# Patient Record
Sex: FEMALE | Race: WHITE | Employment: UNEMPLOYED | URBAN - METROPOLITAN AREA
[De-identification: names, ages, dates, MRNs, and addresses within clinical notes are randomized per-mention and may not be internally consistent; named-entity substitution may affect disease eponyms.]

---

## 2017-01-03 PROBLEM — G43.009 MIGRAINE WITHOUT AURA AND WITHOUT STATUS MIGRAINOSUS, NOT INTRACTABLE: Status: ACTIVE | Noted: 2017-01-03

## 2017-03-20 PROBLEM — M25.50 CHRONIC JOINT PAIN: Status: ACTIVE | Noted: 2017-03-20

## 2017-03-20 PROBLEM — G89.29 CHRONIC JOINT PAIN: Status: ACTIVE | Noted: 2017-03-20

## 2017-09-20 PROBLEM — Z87.891 FORMER SMOKER: Status: ACTIVE | Noted: 2017-09-20

## 2018-01-11 PROBLEM — Z99.89 OSA ON CPAP: Status: ACTIVE | Noted: 2018-01-11

## 2018-01-11 PROBLEM — G47.33 OSA ON CPAP: Status: ACTIVE | Noted: 2018-01-11

## 2018-02-04 PROBLEM — M54.50 CHRONIC LOW BACK PAIN WITHOUT SCIATICA: Status: ACTIVE | Noted: 2018-02-04

## 2018-02-04 PROBLEM — G89.29 CHRONIC LOW BACK PAIN WITHOUT SCIATICA: Status: ACTIVE | Noted: 2018-02-04

## 2018-04-09 PROBLEM — E66.01 SEVERE OBESITY (BMI 35.0-39.9) WITH COMORBIDITY (HCC): Status: ACTIVE | Noted: 2018-04-09

## 2018-10-10 PROBLEM — F33.1 MDD (MAJOR DEPRESSIVE DISORDER), RECURRENT EPISODE, MODERATE (HCC): Status: ACTIVE | Noted: 2018-10-10

## 2021-04-06 ENCOUNTER — TRANSCRIBE ORDER (OUTPATIENT)
Dept: SCHEDULING | Age: 51
End: 2021-04-06

## 2021-04-06 DIAGNOSIS — Z78.0 ASYMPTOMATIC POSTMENOPAUSAL STATE: Primary | ICD-10-CM

## 2021-04-07 ENCOUNTER — TRANSCRIBE ORDER (OUTPATIENT)
Dept: SCHEDULING | Age: 51
End: 2021-04-07

## 2021-04-07 DIAGNOSIS — Z12.31 ENCOUNTER FOR SCREENING MAMMOGRAM FOR MALIGNANT NEOPLASM OF BREAST: Primary | ICD-10-CM

## 2022-03-18 PROBLEM — F33.1 MDD (MAJOR DEPRESSIVE DISORDER), RECURRENT EPISODE, MODERATE (HCC): Status: ACTIVE | Noted: 2018-10-10

## 2022-03-18 PROBLEM — M54.50 CHRONIC LOW BACK PAIN WITHOUT SCIATICA: Status: ACTIVE | Noted: 2018-02-04

## 2022-03-18 PROBLEM — G89.29 CHRONIC LOW BACK PAIN WITHOUT SCIATICA: Status: ACTIVE | Noted: 2018-02-04

## 2022-03-18 PROBLEM — E66.01 SEVERE OBESITY (BMI 35.0-39.9) WITH COMORBIDITY (HCC): Status: ACTIVE | Noted: 2018-04-09

## 2022-03-19 PROBLEM — G89.29 CHRONIC JOINT PAIN: Status: ACTIVE | Noted: 2017-03-20

## 2022-03-19 PROBLEM — G47.33 OSA ON CPAP: Status: ACTIVE | Noted: 2018-01-11

## 2022-03-19 PROBLEM — G43.009 MIGRAINE WITHOUT AURA AND WITHOUT STATUS MIGRAINOSUS, NOT INTRACTABLE: Status: ACTIVE | Noted: 2017-01-03

## 2022-03-19 PROBLEM — Z87.891 FORMER SMOKER: Status: ACTIVE | Noted: 2017-09-20

## 2022-03-19 PROBLEM — M25.50 CHRONIC JOINT PAIN: Status: ACTIVE | Noted: 2017-03-20

## 2022-03-19 PROBLEM — Z99.89 OSA ON CPAP: Status: ACTIVE | Noted: 2018-01-11

## 2023-03-04 ENCOUNTER — HOSPITAL ENCOUNTER (EMERGENCY)
Age: 53
Discharge: HOME | End: 2023-03-04
Attending: EMERGENCY MEDICINE
Payer: MEDICARE

## 2023-03-04 ENCOUNTER — APPOINTMENT (OUTPATIENT)
Dept: CT IMAGING | Age: 53
End: 2023-03-04
Payer: MEDICARE

## 2023-03-04 VITALS
SYSTOLIC BLOOD PRESSURE: 106 MMHG | TEMPERATURE: 97.4 F | HEART RATE: 75 BPM | WEIGHT: 200 LBS | RESPIRATION RATE: 14 BRPM | DIASTOLIC BLOOD PRESSURE: 66 MMHG | OXYGEN SATURATION: 99 % | BODY MASS INDEX: 31.39 KG/M2 | HEIGHT: 67 IN

## 2023-03-04 DIAGNOSIS — R19.7 NAUSEA VOMITING AND DIARRHEA: Primary | ICD-10-CM

## 2023-03-04 DIAGNOSIS — R53.1 GENERALIZED WEAKNESS: ICD-10-CM

## 2023-03-04 DIAGNOSIS — R11.2 NAUSEA VOMITING AND DIARRHEA: Primary | ICD-10-CM

## 2023-03-04 DIAGNOSIS — E86.0 DEHYDRATION: ICD-10-CM

## 2023-03-04 LAB
ALBUMIN SERPL-MCNC: 3.9 G/DL (ref 3.5–5)
ALBUMIN/GLOB SERPL: 1.1 (ref 0.4–1.6)
ALP SERPL-CCNC: 102 U/L (ref 50–130)
ALT SERPL-CCNC: 19 U/L (ref 12–65)
ANION GAP SERPL CALC-SCNC: 6 MMOL/L (ref 2–11)
AST SERPL-CCNC: 18 U/L (ref 15–37)
BASOPHILS # BLD: 0 K/UL (ref 0–0.2)
BASOPHILS NFR BLD: 1 % (ref 0–2)
BILIRUB SERPL-MCNC: 0.6 MG/DL (ref 0.2–1.1)
BUN SERPL-MCNC: 25 MG/DL (ref 6–23)
C. DIFFICILE TOXIN MOLECULAR: NEGATIVE
CALCIUM SERPL-MCNC: 9.3 MG/DL (ref 8.3–10.4)
CHLORIDE SERPL-SCNC: 112 MMOL/L (ref 101–110)
CO2 SERPL-SCNC: 25 MMOL/L (ref 21–32)
CREAT SERPL-MCNC: 0.8 MG/DL (ref 0.6–1)
DIFFERENTIAL METHOD BLD: ABNORMAL
EOSINOPHIL # BLD: 0.1 K/UL (ref 0–0.8)
EOSINOPHIL NFR BLD: 2 % (ref 0.5–7.8)
ERYTHROCYTE [DISTWIDTH] IN BLOOD BY AUTOMATED COUNT: 13.1 % (ref 11.9–14.6)
ETHANOL SERPL-MCNC: <3 MG/DL (ref 0–0.08)
GLOBULIN SER CALC-MCNC: 3.5 G/DL (ref 2.8–4.5)
GLUCOSE SERPL-MCNC: 148 MG/DL (ref 65–100)
HCT VFR BLD AUTO: 50.9 % (ref 35.8–46.3)
HGB BLD-MCNC: 16.8 G/DL (ref 11.7–15.4)
IMM GRANULOCYTES # BLD AUTO: 0 K/UL (ref 0–0.5)
IMM GRANULOCYTES NFR BLD AUTO: 0 % (ref 0–5)
LIPASE SERPL-CCNC: 89 U/L (ref 73–393)
LYMPHOCYTES # BLD: 0.6 K/UL (ref 0.5–4.6)
LYMPHOCYTES NFR BLD: 8 % (ref 13–44)
MCH RBC QN AUTO: 30.8 PG (ref 26.1–32.9)
MCHC RBC AUTO-ENTMCNC: 33 G/DL (ref 31.4–35)
MCV RBC AUTO: 93.2 FL (ref 82–102)
MONOCYTES # BLD: 0.6 K/UL (ref 0.1–1.3)
MONOCYTES NFR BLD: 8 % (ref 4–12)
NEUTS SEG # BLD: 6 K/UL (ref 1.7–8.2)
NEUTS SEG NFR BLD: 81 % (ref 43–78)
NRBC # BLD: 0 K/UL (ref 0–0.2)
PLATELET # BLD AUTO: 238 K/UL (ref 150–450)
PMV BLD AUTO: 10.5 FL (ref 9.4–12.3)
POTASSIUM SERPL-SCNC: 3.8 MMOL/L (ref 3.5–5.1)
PROT SERPL-MCNC: 7.4 G/DL (ref 6.3–8.2)
RBC # BLD AUTO: 5.46 M/UL (ref 4.05–5.2)
SODIUM SERPL-SCNC: 143 MMOL/L (ref 133–143)
WBC # BLD AUTO: 7.4 K/UL (ref 4.3–11.1)

## 2023-03-04 PROCEDURE — 80053 COMPREHEN METABOLIC PANEL: CPT

## 2023-03-04 PROCEDURE — 96375 TX/PRO/DX INJ NEW DRUG ADDON: CPT

## 2023-03-04 PROCEDURE — 70450 CT HEAD/BRAIN W/O DYE: CPT

## 2023-03-04 PROCEDURE — 2580000003 HC RX 258: Performed by: EMERGENCY MEDICINE

## 2023-03-04 PROCEDURE — 85025 COMPLETE CBC W/AUTO DIFF WBC: CPT

## 2023-03-04 PROCEDURE — A4216 STERILE WATER/SALINE, 10 ML: HCPCS | Performed by: EMERGENCY MEDICINE

## 2023-03-04 PROCEDURE — 6370000000 HC RX 637 (ALT 250 FOR IP): Performed by: EMERGENCY MEDICINE

## 2023-03-04 PROCEDURE — 93005 ELECTROCARDIOGRAM TRACING: CPT | Performed by: EMERGENCY MEDICINE

## 2023-03-04 PROCEDURE — 96361 HYDRATE IV INFUSION ADD-ON: CPT

## 2023-03-04 PROCEDURE — 87493 C DIFF AMPLIFIED PROBE: CPT

## 2023-03-04 PROCEDURE — 87046 STOOL CULTR AEROBIC BACT EA: CPT

## 2023-03-04 PROCEDURE — 83690 ASSAY OF LIPASE: CPT

## 2023-03-04 PROCEDURE — 82077 ASSAY SPEC XCP UR&BREATH IA: CPT

## 2023-03-04 PROCEDURE — 6360000002 HC RX W HCPCS: Performed by: EMERGENCY MEDICINE

## 2023-03-04 PROCEDURE — 96374 THER/PROPH/DIAG INJ IV PUSH: CPT

## 2023-03-04 PROCEDURE — C9113 INJ PANTOPRAZOLE SODIUM, VIA: HCPCS | Performed by: EMERGENCY MEDICINE

## 2023-03-04 PROCEDURE — 99284 EMERGENCY DEPT VISIT MOD MDM: CPT

## 2023-03-04 RX ORDER — DIPHENOXYLATE HYDROCHLORIDE AND ATROPINE SULFATE 2.5; .025 MG/1; MG/1
1 TABLET ORAL
Status: COMPLETED | OUTPATIENT
Start: 2023-03-04 | End: 2023-03-04

## 2023-03-04 RX ORDER — ONDANSETRON 4 MG/1
4 TABLET, FILM COATED ORAL EVERY 8 HOURS PRN
Qty: 14 TABLET | Refills: 0 | Status: SHIPPED | OUTPATIENT
Start: 2023-03-04

## 2023-03-04 RX ORDER — KETOROLAC TROMETHAMINE 30 MG/ML
30 INJECTION, SOLUTION INTRAMUSCULAR; INTRAVENOUS
Status: COMPLETED | OUTPATIENT
Start: 2023-03-04 | End: 2023-03-04

## 2023-03-04 RX ORDER — ONDANSETRON 2 MG/ML
4 INJECTION INTRAMUSCULAR; INTRAVENOUS
Status: COMPLETED | OUTPATIENT
Start: 2023-03-04 | End: 2023-03-04

## 2023-03-04 RX ORDER — 0.9 % SODIUM CHLORIDE 0.9 %
1000 INTRAVENOUS SOLUTION INTRAVENOUS ONCE
Status: COMPLETED | OUTPATIENT
Start: 2023-03-04 | End: 2023-03-04

## 2023-03-04 RX ORDER — HYOSCYAMINE SULFATE 0.12 MG/1
1 TABLET SUBLINGUAL
Qty: 20 EACH | Refills: 0 | Status: SHIPPED | OUTPATIENT
Start: 2023-03-04

## 2023-03-04 RX ADMIN — DIPHENOXYLATE HYDROCHLORIDE AND ATROPINE SULFATE 1 TABLET: 2.5; .025 TABLET ORAL at 17:21

## 2023-03-04 RX ADMIN — SODIUM CHLORIDE 40 MG: 9 INJECTION, SOLUTION INTRAMUSCULAR; INTRAVENOUS; SUBCUTANEOUS at 17:17

## 2023-03-04 RX ADMIN — ONDANSETRON 4 MG: 2 INJECTION INTRAMUSCULAR; INTRAVENOUS at 16:32

## 2023-03-04 RX ADMIN — KETOROLAC TROMETHAMINE 30 MG: 30 INJECTION, SOLUTION INTRAMUSCULAR; INTRAVENOUS at 17:29

## 2023-03-04 RX ADMIN — SODIUM CHLORIDE 1000 ML: 9 INJECTION, SOLUTION INTRAVENOUS at 16:31

## 2023-03-04 ASSESSMENT — PAIN DESCRIPTION - LOCATION
LOCATION: BACK;ABDOMEN
LOCATION: BACK;LEG
LOCATION: BACK

## 2023-03-04 ASSESSMENT — ENCOUNTER SYMPTOMS
NAUSEA: 1
VOMITING: 1
COUGH: 0
SHORTNESS OF BREATH: 0
BLOOD IN STOOL: 0
COLOR CHANGE: 0
ABDOMINAL PAIN: 0
ANAL BLEEDING: 0
BACK PAIN: 0
RHINORRHEA: 0
DIARRHEA: 1
ABDOMINAL DISTENTION: 0

## 2023-03-04 ASSESSMENT — PAIN SCALES - GENERAL
PAINLEVEL_OUTOF10: 8
PAINLEVEL_OUTOF10: 5
PAINLEVEL_OUTOF10: 10

## 2023-03-04 ASSESSMENT — LIFESTYLE VARIABLES
HOW MANY STANDARD DRINKS CONTAINING ALCOHOL DO YOU HAVE ON A TYPICAL DAY: PATIENT DOES NOT DRINK
HOW OFTEN DO YOU HAVE A DRINK CONTAINING ALCOHOL: NEVER

## 2023-03-04 ASSESSMENT — PAIN - FUNCTIONAL ASSESSMENT: PAIN_FUNCTIONAL_ASSESSMENT: 0-10

## 2023-03-04 NOTE — DISCHARGE INSTRUCTIONS
Take medication as prescribed. Schedule follow-up with primary care physician, gastroenterology. Return to ED if symptoms worsen or progress in any way.

## 2023-03-04 NOTE — ED TRIAGE NOTES
Patient arrives from home via EMS for nausea vomiting and diarrhea. Patient reports she ate some food early this AM and then began feeling sick. Patient anxious during triage not answering question about history.

## 2023-03-04 NOTE — ED PROVIDER NOTES
Emergency Department Provider Note                   PCP:                Georgina Carrizales MD               Age: 46 y.o. Sex: female     DISPOSITION Decision To Discharge 03/04/2023 06:00:52 PM       ICD-10-CM    1. Nausea vomiting and diarrhea  R11.2     R19.7       2. Generalized weakness  R53.1       3. Dehydration  E86.0           MEDICAL DECISION MAKING  Complexity of Problems Addressed:  1 or more acute illnesses that pose a threat to life or bodily function. Data Reviewed and Analyzed:  Category 1:   I reviewed records from an external source: provider visit notes from PCP. I reviewed records from an external source: provider visit notes from outside specialist.  I reviewed records from an external source: previous lab results from outside ED. I ordered each unique test.  I reviewed the results of each unique test.    The patients assessment required an independent historian: Additional historical information obtained from patient's daughter who is present at bedside in regards to presentation and recent illness. Category 2:   I independently ordered and reviewed the EKG. I independently ordered and reviewed the CT Scan. CT head reviewed by myself. No acute intracranial abnormality noted. In agreement with radiologist interpretation. Category 3: Discussion of management or test interpretation. 50-year-old female presents with complaint of nausea, vomiting, diarrhea after eating left over meal from tropical [employer] this morning. States that she ate at around 1 AM and woke up around 4 AM with persistent symptoms. Patient reports fatigue and generalized weakness after numerous episodes of loose stool and vomiting. Later patient states that she felt very weak in her bathroom after constantly vomiting. Upon EMS arrival they helped set her up on the side of her bathtub and in the process she hit her head on her tub. Denies LOC. Denies neck pain. Denies numbness, tingling, weakness. Patient reports chronic back pain. Patient states that she did not fall prior to EMS arriving. Vital signs stable. Afebrile.    =============================================  Differential diagnosis includes but is not limited to food poisoning, gastroenteritis, pancreatitis, colitis, dehydration, TONE, C. Difficile, UTI, closed head injury, etc.  =============================================  Upon patient being placed in room, patient ill appearing. Patient became nauseated and vomited large quantity of rice and beans on the floor and on the bed. Simultaneously, patient had large episode of explosive diarrhea on bed. Patient and room had to be thoroughly cleaned. Patient given IV fluid hydration, Zofran, Lomotil. Basic labs were obtained. No leukocytosis noted. CMP unremarkable. Creatinine stable at 0.8. No significant electrolyte abnormalities present. EKG was obtained. EKG with normal sinus rhythm. Heart rate 76. No ST elevation. EKG poor quality. Patient with no chest pain or shortness of breath. Later into ED visit as previously stated patient states that she hit her head on her toe up with the assistance of EMS. Denies LOC. CT head was obtained with no evidence of acute intracranial abnormality. Discussed obtaining additional abdominal imaging. Patient denies any abdominal discomfort or pain at this time. Abdomen soft, nontender with no rebound or guarding. No indication for CT at this time. Stool studies were obtained. During ED course patient reports chronic back pain exacerbated by having to lay in the bed. Patient request pain medication for her back. Toradol given with relief of symptoms. Patient states that she did not injure her back today. States she has chronic issues in regards to this.  ============================================  On reassessment, patient states that symptoms have resolved and she feels significantly better.   Patient states ready for discharge home at this time. Will discharge home with Zofran, Levsin. Patient instructed to follow bland diet and to drink plenty of fluids. Patient instructed to follow-up with primary care physician and given strict return precautions. ED Course as of 03/05/23 1419   Sat Mar 04, 2023   1828 CT head    FINDINGS:     Bones and extracranial soft tissues:     Calvarium is intact. Mild mucosal thickening involving the ethmoids. The imaged   mastoid air cells and middle ear cavities are essentially clear. Included globes   and orbits are unremarkable. Degenerative changes involving the bilateral TMJs. Intracranial contents:     Brain parenchymal volume appears roughly commensurate for the patient's age. Mild to moderate hypoattenuating foci within the subcortical, deep,   periventricular white matter, nonspecific. Basal cisterns are patent. No   hemorrhage, extra-axial collection, or hydrocephalus. No findings of acute large   vessel territory infarction. No noncontrast CT findings of a discrete focal   suspicious intracranial mass or acute intracranial mass effect. Atherosclerosis   involving the intracranial carotid siphons. IMPRESSION:     1. No acute intracranial noncontrast CT findings. Specifically; no findings of   acute intracranial hemorrhage, acute large vessel territory infarction, or acute   intracranial mass effect. 2.  Atherosclerosis. Mild/moderate white matter disease, nonspecific, but maybe   attributable to mild/moderate chronic small vessel ischemic changes. 3.  Incidental findings, as detailed. [DF]      ED Course User Index  [DF] Dmitriy Zacarias MD       Risk of Complications and/or Morbidity of Patient Management:  OTC drug management performed and Prescription drug management performed     Shraddha Laar is a 46 y.o. female who presents to the Emergency Department with chief complaint of nausea, vomiting, diarrhea.     Chief Complaint   Patient presents with Emesis    Diarrhea      48-year-old female with history of SHALOM, MDD, DAMIEN on CPAP, former tobacco user, chronic lower back pain, breast cancer, PUD presents with complaint of persistent nausea, vomiting, diarrhea after eating [employer] [place] at around 1 AM last night. States that she woke up at around 4 to 5 AM feeling nauseated. States that she has vomited numerous times today as well as had numerous episodes of loose stool. Denies melena, hematochezia. Denies fever, chills. Reports generalized weakness, fatigue. Denies any recent sick contacts. Denies abdominal pain, urinary symptoms, focal weakness, headache, neck stiffness. Denies any recent antibiotic use. The history is provided by the patient. No  was used. Review of Systems   Constitutional:  Positive for fatigue. Negative for chills, diaphoresis and fever. HENT:  Negative for congestion and rhinorrhea. Respiratory:  Negative for cough and shortness of breath. Cardiovascular:  Negative for chest pain. Gastrointestinal:  Positive for diarrhea, nausea and vomiting. Negative for abdominal distention, abdominal pain, anal bleeding and blood in stool. Genitourinary:  Negative for dysuria, flank pain and hematuria. Musculoskeletal:  Negative for arthralgias, back pain and myalgias. Skin:  Negative for color change and rash. Neurological:  Negative for tremors, seizures, facial asymmetry, speech difficulty, numbness and headaches. Hematological:  Does not bruise/bleed easily. Psychiatric/Behavioral:  Negative for confusion. Vitals signs and nursing note reviewed. No data found. Physical Exam  Vitals and nursing note reviewed. Constitutional:       Appearance: Normal appearance. Comments: Patient actively vomiting and having explosive diarrhea while in the bed. HENT:      Head: Normocephalic. Comments: Atraumatic.      Nose: Nose normal.      Mouth/Throat:      Mouth: Mucous membranes are moist.   Eyes:      Extraocular Movements: Extraocular movements intact. Pupils: Pupils are equal, round, and reactive to light. Neck:      Comments: Full range of motion. No midline C-spine tenderness to palpation. No step-off. Cardiovascular:      Rate and Rhythm: Normal rate. Pulses: Normal pulses. Heart sounds: Normal heart sounds. Pulmonary:      Effort: Pulmonary effort is normal.      Breath sounds: Normal breath sounds. Abdominal:      General: There is no distension. Palpations: Abdomen is soft. Tenderness: There is no abdominal tenderness. There is no guarding or rebound. Comments: Soft, nontender, nondistended. No rebound or guarding. No peritoneal signs. Musculoskeletal:         General: Normal range of motion. Skin:     General: Skin is warm. Findings: No erythema or rash. Neurological:      General: No focal deficit present. Mental Status: She is alert and oriented to person, place, and time. Cranial Nerves: No cranial nerve deficit. Sensory: No sensory deficit. Motor: No weakness. Comments: No facial droop. No dysarthria. No focal deficits.         EKG 12 Lead    Date/Time: 3/4/2023 6:41 PM  Performed by: Indira Doherty MD  Authorized by: Indira Doherty MD     ECG reviewed by ED Physician in the absence of a cardiologist: yes    Rate:     ECG rate:  76    ECG rate assessment: normal    Rhythm:     Rhythm: sinus rhythm    Ectopy:     Ectopy: none    QRS:     QRS axis:  Normal    QRS intervals:  Normal    QRS conduction: normal    ST segments:     ST segments:  Normal  T waves:     T waves: non-specific       Orders Placed This Encounter   Procedures    Culture, Stool    C difficile Molecular/PCR    CT HEAD WO CONTRAST    CBC with Auto Differential    CMP    Lipase    Ethanol    EKG 12 Lead    Saline lock IV        Medications   0.9 % sodium chloride bolus (0 mLs IntraVENous Stopped 3/4/23 1840)   ondansetron (ZOFRAN) injection 4 mg (4 mg IntraVENous Given 3/4/23 1632)   pantoprazole (PROTONIX) 40 mg in sodium chloride (PF) 0.9 % 10 mL injection (40 mg IntraVENous Given 3/4/23 1717)   diphenoxylate-atropine (LOMOTIL) 2.5-0.025 MG per tablet 1 tablet (1 tablet Oral Given 3/4/23 1721)   ketorolac (TORADOL) injection 30 mg (30 mg IntraVENous Given 3/4/23 1729)       Discharge Medication List as of 3/4/2023  7:40 PM        START taking these medications    Details   ondansetron (ZOFRAN) 4 MG tablet Take 1 tablet by mouth every 8 hours as needed for Nausea or Vomiting, Disp-14 tablet, R-0Print      Hyoscyamine Sulfate SL (LEVSIN/SL) 0.125 MG SUBL Place 1 tablet under the tongue every 6-8 hours as needed (abdominal cramping, diarrhea), Disp-20 each, R-0Print              No past medical history on file. No past surgical history on file. No family history on file. Social History     Socioeconomic History    Marital status:         Allergies: Adhesive tape    Discharge Medication List as of 3/4/2023  7:40 PM           Results for orders placed or performed during the hospital encounter of 03/04/23   Culture, Stool    Specimen: Stool   Result Value Ref Range    Special Requests NO SPECIAL REQUESTS      Culture        No growth after short period of incubation. Further results to follow after overnight incubation. C difficile Molecular/PCR    Specimen: Miscellaneous sample   Result Value Ref Range    C. difficile toxin Molecular Negative NEG     CT HEAD WO CONTRAST    Narrative    EXAMINATION: CT HEAD WO CONTRAST    DATE: 3/4/2023 5:30 PM     INDICATION: Fall, closed head injury. COMPARISON: None available. TECHNIQUE: Thin section noncontrast axial images were obtained through the head. Coronal reformatted images were created.   CT dose lowering techniques were   used, to include: automated exposure control, adjustment for patient size, and   or use of iterative reconstruction    FINDINGS:    Bones and extracranial soft tissues:    Calvarium is intact. Mild mucosal thickening involving the ethmoids. The imaged   mastoid air cells and middle ear cavities are essentially clear. Included globes   and orbits are unremarkable. Degenerative changes involving the bilateral TMJs. Intracranial contents:    Brain parenchymal volume appears roughly commensurate for the patient's age. Mild to moderate hypoattenuating foci within the subcortical, deep,   periventricular white matter, nonspecific. Basal cisterns are patent. No   hemorrhage, extra-axial collection, or hydrocephalus. No findings of acute large   vessel territory infarction. No noncontrast CT findings of a discrete focal   suspicious intracranial mass or acute intracranial mass effect. Atherosclerosis   involving the intracranial carotid siphons. Impression    1. No acute intracranial noncontrast CT findings. Specifically; no findings of   acute intracranial hemorrhage, acute large vessel territory infarction, or acute   intracranial mass effect. 2.  Atherosclerosis. Mild/moderate white matter disease, nonspecific, but maybe   attributable to mild/moderate chronic small vessel ischemic changes. 3.  Incidental findings, as detailed. Haseeb England MD  Neuroradiologist  [facility],   [email or web address]      Thank you for this referral. This exam was interpreted by a fellowship trained   neuroradiologist. If the patient's healthcare provider has any questions, a   [facility] neuroradiologist can be reached directly at [phone] at any   time.     SLOT: 80     [facility]   3/4/2023 6:03:00 PM   CBC with Auto Differential   Result Value Ref Range    WBC 7.4 4.3 - 11.1 K/uL    RBC 5.46 (H) 4.05 - 5.2 M/uL    Hemoglobin 16.8 (H) 11.7 - 15.4 g/dL    Hematocrit 50.9 (H) 35.8 - 46.3 %    MCV 93.2 82.0 - 102.0 FL    MCH 30.8 26.1 - 32.9 PG    MCHC 33.0 31.4 - 35.0 g/dL    RDW 13.1 11.9 - 14.6 %    Platelets 358 951 - 727 K/uL    MPV 10.5 9.4 - 12.3 FL    nRBC 0.00 0.0 - 0.2 K/uL    Differential Type AUTOMATED      Seg Neutrophils 81 (H) 43 - 78 %    Lymphocytes 8 (L) 13 - 44 %    Monocytes 8 4.0 - 12.0 %    Eosinophils % 2 0.5 - 7.8 %    Basophils 1 0.0 - 2.0 %    Immature Granulocytes 0 0.0 - 5.0 %    Segs Absolute 6.0 1.7 - 8.2 K/UL    Absolute Lymph # 0.6 0.5 - 4.6 K/UL    Absolute Mono # 0.6 0.1 - 1.3 K/UL    Absolute Eos # 0.1 0.0 - 0.8 K/UL    Basophils Absolute 0.0 0.0 - 0.2 K/UL    Absolute Immature Granulocyte 0.0 0.0 - 0.5 K/UL   CMP   Result Value Ref Range    Sodium 143 133 - 143 mmol/L    Potassium 3.8 3.5 - 5.1 mmol/L    Chloride 112 (H) 101 - 110 mmol/L    CO2 25 21 - 32 mmol/L    Anion Gap 6 2 - 11 mmol/L    Glucose 148 (H) 65 - 100 mg/dL    BUN 25 (H) 6 - 23 MG/DL    Creatinine 0.80 0.6 - 1.0 MG/DL    Est, Glom Filt Rate >60 >60 ml/min/1.73m2    Calcium 9.3 8.3 - 10.4 MG/DL    Total Bilirubin 0.6 0.2 - 1.1 MG/DL    ALT 19 12 - 65 U/L    AST 18 15 - 37 U/L    Alk Phosphatase 102 50 - 130 U/L    Total Protein 7.4 6.3 - 8.2 g/dL    Albumin 3.9 3.5 - 5.0 g/dL    Globulin 3.5 2.8 - 4.5 g/dL    Albumin/Globulin Ratio 1.1 0.4 - 1.6     Lipase   Result Value Ref Range    Lipase 89 73 - 393 U/L   Ethanol   Result Value Ref Range    Ethanol Lvl <3 MG/DL        CT HEAD WO CONTRAST   Final Result      1. No acute intracranial noncontrast CT findings. Specifically; no findings of    acute intracranial hemorrhage, acute large vessel territory infarction, or acute    intracranial mass effect. 2.  Atherosclerosis. Mild/moderate white matter disease, nonspecific, but maybe    attributable to mild/moderate chronic small vessel ischemic changes. 3.  Incidental findings, as detailed. Guerita Monday, MD   Neuroradiologist   [facility],    [email or web address]         Thank you for this referral. This exam was interpreted by a fellowship trained    neuroradiologist. If the patient's healthcare provider has any questions, a    [facility] neuror[facility]ologist can be reached directly at [phone] at any    time. SLOT: 80       [facility]    3/4/2023 6:03:00 PM                        Voice dictation software was used during the making of this note. This software is not perfect and grammatical and other typographical errors may be present. This note has not been completely proofread for errors.      King Johnson MD  03/05/23 9432

## 2023-03-05 LAB
BACTERIA SPEC CULT: NORMAL
SERVICE CMNT-IMP: NORMAL

## 2023-03-07 LAB
BACTERIA SPEC CULT: NORMAL
SERVICE CMNT-IMP: NORMAL